# Patient Record
Sex: FEMALE | Race: BLACK OR AFRICAN AMERICAN | URBAN - METROPOLITAN AREA
[De-identification: names, ages, dates, MRNs, and addresses within clinical notes are randomized per-mention and may not be internally consistent; named-entity substitution may affect disease eponyms.]

---

## 2018-01-01 ENCOUNTER — INPATIENT (INPATIENT)
Facility: HOSPITAL | Age: 83
LOS: 3 days | DRG: 177 | End: 2018-04-09
Attending: STUDENT IN AN ORGANIZED HEALTH CARE EDUCATION/TRAINING PROGRAM | Admitting: STUDENT IN AN ORGANIZED HEALTH CARE EDUCATION/TRAINING PROGRAM
Payer: MEDICARE

## 2018-01-01 VITALS — TEMPERATURE: 97 F | RESPIRATION RATE: 14 BRPM | HEART RATE: 56 BPM | OXYGEN SATURATION: 96 %

## 2018-01-01 VITALS
DIASTOLIC BLOOD PRESSURE: 39 MMHG | HEART RATE: 30 BPM | SYSTOLIC BLOOD PRESSURE: 108 MMHG | RESPIRATION RATE: 16 BRPM | TEMPERATURE: 97 F

## 2018-01-01 DIAGNOSIS — Z78.9 OTHER SPECIFIED HEALTH STATUS: ICD-10-CM

## 2018-01-01 DIAGNOSIS — Z71.89 OTHER SPECIFIED COUNSELING: ICD-10-CM

## 2018-01-01 DIAGNOSIS — R63.8 OTHER SYMPTOMS AND SIGNS CONCERNING FOOD AND FLUID INTAKE: ICD-10-CM

## 2018-01-01 DIAGNOSIS — Z66 DO NOT RESUSCITATE: ICD-10-CM

## 2018-01-01 DIAGNOSIS — R11.10 VOMITING, UNSPECIFIED: ICD-10-CM

## 2018-01-01 DIAGNOSIS — J18.9 PNEUMONIA, UNSPECIFIED ORGANISM: ICD-10-CM

## 2018-01-01 DIAGNOSIS — I45.9 CONDUCTION DISORDER, UNSPECIFIED: ICD-10-CM

## 2018-01-01 DIAGNOSIS — I10 ESSENTIAL (PRIMARY) HYPERTENSION: ICD-10-CM

## 2018-01-01 DIAGNOSIS — Z51.5 ENCOUNTER FOR PALLIATIVE CARE: ICD-10-CM

## 2018-01-01 DIAGNOSIS — R00.1 BRADYCARDIA, UNSPECIFIED: ICD-10-CM

## 2018-01-01 DIAGNOSIS — Z29.9 ENCOUNTER FOR PROPHYLACTIC MEASURES, UNSPECIFIED: ICD-10-CM

## 2018-01-01 DIAGNOSIS — R09.89 OTHER SPECIFIED SYMPTOMS AND SIGNS INVOLVING THE CIRCULATORY AND RESPIRATORY SYSTEMS: ICD-10-CM

## 2018-01-01 DIAGNOSIS — I95.9 HYPOTENSION, UNSPECIFIED: ICD-10-CM

## 2018-01-01 DIAGNOSIS — R41.82 ALTERED MENTAL STATUS, UNSPECIFIED: ICD-10-CM

## 2018-01-01 DIAGNOSIS — E87.8 OTHER DISORDERS OF ELECTROLYTE AND FLUID BALANCE, NOT ELSEWHERE CLASSIFIED: ICD-10-CM

## 2018-01-01 DIAGNOSIS — D64.9 ANEMIA, UNSPECIFIED: ICD-10-CM

## 2018-01-01 LAB
ALBUMIN SERPL ELPH-MCNC: 3.4 G/DL — SIGNIFICANT CHANGE UP (ref 3.3–5)
ALP SERPL-CCNC: 92 U/L — SIGNIFICANT CHANGE UP (ref 40–120)
ALT FLD-CCNC: 84 U/L — HIGH (ref 10–45)
ANION GAP SERPL CALC-SCNC: 15 MMOL/L — SIGNIFICANT CHANGE UP (ref 5–17)
APTT BLD: 28.4 SEC — SIGNIFICANT CHANGE UP (ref 27.5–37.4)
AST SERPL-CCNC: 79 U/L — HIGH (ref 10–40)
BASE EXCESS BLDV CALC-SCNC: -2.3 MMOL/L — SIGNIFICANT CHANGE UP
BILIRUB SERPL-MCNC: 0.3 MG/DL — SIGNIFICANT CHANGE UP (ref 0.2–1.2)
BLD GP AB SCN SERPL QL: NEGATIVE — SIGNIFICANT CHANGE UP
BUN SERPL-MCNC: 43 MG/DL — HIGH (ref 7–23)
CA-I SERPL-SCNC: 1.43 MMOL/L — HIGH (ref 1.12–1.3)
CALCIUM SERPL-MCNC: 12.1 MG/DL — HIGH (ref 8.4–10.5)
CHLORIDE SERPL-SCNC: 74 MMOL/L — LOW (ref 96–108)
CK MB CFR SERPL CALC: 4.6 NG/ML — SIGNIFICANT CHANGE UP (ref 0–6.7)
CK SERPL-CCNC: 139 U/L — SIGNIFICANT CHANGE UP (ref 25–170)
CO2 SERPL-SCNC: 21 MMOL/L — LOW (ref 22–31)
CREAT SERPL-MCNC: 1.6 MG/DL — HIGH (ref 0.5–1.3)
GAS PNL BLDV: 109 MMOL/L — CRITICAL LOW (ref 138–146)
GAS PNL BLDV: SIGNIFICANT CHANGE UP
GAS PNL BLDV: SIGNIFICANT CHANGE UP
GLUCOSE SERPL-MCNC: 161 MG/DL — HIGH (ref 70–99)
HCO3 BLDV-SCNC: 22 MMOL/L — SIGNIFICANT CHANGE UP (ref 20–27)
HCT VFR BLD CALC: 22.8 % — LOW (ref 34.5–45)
HGB BLD-MCNC: 8 G/DL — LOW (ref 11.5–15.5)
INR BLD: 1.06 — SIGNIFICANT CHANGE UP (ref 0.88–1.16)
LACTATE SERPL-SCNC: 4.4 MMOL/L — CRITICAL HIGH (ref 0.5–2)
MCHC RBC-ENTMCNC: 25.6 PG — LOW (ref 27–34)
MCHC RBC-ENTMCNC: 35.1 G/DL — SIGNIFICANT CHANGE UP (ref 32–36)
MCV RBC AUTO: 72.8 FL — LOW (ref 80–100)
PCO2 BLDV: 36 MMHG — LOW (ref 41–51)
PH BLDV: 7.4 — SIGNIFICANT CHANGE UP (ref 7.32–7.43)
PLATELET # BLD AUTO: 274 K/UL — SIGNIFICANT CHANGE UP (ref 150–400)
PO2 BLDV: 49 MMHG — SIGNIFICANT CHANGE UP
POTASSIUM BLDV-SCNC: 4.7 MMOL/L — SIGNIFICANT CHANGE UP (ref 3.5–4.9)
POTASSIUM SERPL-MCNC: 5.4 MMOL/L — HIGH (ref 3.5–5.3)
POTASSIUM SERPL-SCNC: 5.4 MMOL/L — HIGH (ref 3.5–5.3)
PROT SERPL-MCNC: 6.2 G/DL — SIGNIFICANT CHANGE UP (ref 6–8.3)
PROTHROM AB SERPL-ACNC: 11.8 SEC — SIGNIFICANT CHANGE UP (ref 9.8–12.7)
RBC # BLD: 3.13 M/UL — LOW (ref 3.8–5.2)
RBC # FLD: 15.1 % — SIGNIFICANT CHANGE UP (ref 10.3–16.9)
RH IG SCN BLD-IMP: POSITIVE — SIGNIFICANT CHANGE UP
SAO2 % BLDV: 80 % — SIGNIFICANT CHANGE UP
SODIUM SERPL-SCNC: 110 MMOL/L — CRITICAL LOW (ref 135–145)
TROPONIN T SERPL-MCNC: 0.04 NG/ML — HIGH (ref 0–0.01)
WBC # BLD: 7.8 K/UL — SIGNIFICANT CHANGE UP (ref 3.8–10.5)
WBC # FLD AUTO: 7.8 K/UL — SIGNIFICANT CHANGE UP (ref 3.8–10.5)

## 2018-01-01 PROCEDURE — 99223 1ST HOSP IP/OBS HIGH 75: CPT | Mod: GC

## 2018-01-01 PROCEDURE — 71045 X-RAY EXAM CHEST 1 VIEW: CPT

## 2018-01-01 PROCEDURE — 99291 CRITICAL CARE FIRST HOUR: CPT | Mod: 25

## 2018-01-01 PROCEDURE — 96374 THER/PROPH/DIAG INJ IV PUSH: CPT

## 2018-01-01 PROCEDURE — 99233 SBSQ HOSP IP/OBS HIGH 50: CPT | Mod: GC

## 2018-01-01 PROCEDURE — 85027 COMPLETE CBC AUTOMATED: CPT

## 2018-01-01 PROCEDURE — 82803 BLOOD GASES ANY COMBINATION: CPT

## 2018-01-01 PROCEDURE — 86850 RBC ANTIBODY SCREEN: CPT

## 2018-01-01 PROCEDURE — 96375 TX/PRO/DX INJ NEW DRUG ADDON: CPT

## 2018-01-01 PROCEDURE — 86900 BLOOD TYPING SEROLOGIC ABO: CPT

## 2018-01-01 PROCEDURE — 86901 BLOOD TYPING SEROLOGIC RH(D): CPT

## 2018-01-01 PROCEDURE — 84132 ASSAY OF SERUM POTASSIUM: CPT

## 2018-01-01 PROCEDURE — 99233 SBSQ HOSP IP/OBS HIGH 50: CPT

## 2018-01-01 PROCEDURE — 82550 ASSAY OF CK (CPK): CPT

## 2018-01-01 PROCEDURE — 85610 PROTHROMBIN TIME: CPT

## 2018-01-01 PROCEDURE — 80053 COMPREHEN METABOLIC PANEL: CPT

## 2018-01-01 PROCEDURE — 84295 ASSAY OF SERUM SODIUM: CPT

## 2018-01-01 PROCEDURE — 99223 1ST HOSP IP/OBS HIGH 75: CPT

## 2018-01-01 PROCEDURE — 36415 COLL VENOUS BLD VENIPUNCTURE: CPT

## 2018-01-01 PROCEDURE — 99291 CRITICAL CARE FIRST HOUR: CPT

## 2018-01-01 PROCEDURE — 71045 X-RAY EXAM CHEST 1 VIEW: CPT | Mod: 26

## 2018-01-01 PROCEDURE — 93005 ELECTROCARDIOGRAM TRACING: CPT

## 2018-01-01 PROCEDURE — 82330 ASSAY OF CALCIUM: CPT

## 2018-01-01 PROCEDURE — 85730 THROMBOPLASTIN TIME PARTIAL: CPT

## 2018-01-01 PROCEDURE — 82553 CREATINE MB FRACTION: CPT

## 2018-01-01 PROCEDURE — 87040 BLOOD CULTURE FOR BACTERIA: CPT

## 2018-01-01 PROCEDURE — 84484 ASSAY OF TROPONIN QUANT: CPT

## 2018-01-01 PROCEDURE — 82962 GLUCOSE BLOOD TEST: CPT

## 2018-01-01 PROCEDURE — 99497 ADVNCD CARE PLAN 30 MIN: CPT | Mod: 25

## 2018-01-01 PROCEDURE — 83605 ASSAY OF LACTIC ACID: CPT

## 2018-01-01 RX ORDER — SODIUM CHLORIDE 9 MG/ML
1000 INJECTION INTRAMUSCULAR; INTRAVENOUS; SUBCUTANEOUS ONCE
Qty: 0 | Refills: 0 | Status: COMPLETED | OUTPATIENT
Start: 2018-01-01 | End: 2018-01-01

## 2018-01-01 RX ORDER — ATROPINE SULFATE 0.1 MG/ML
1 SYRINGE (ML) INJECTION ONCE
Qty: 0 | Refills: 0 | Status: COMPLETED | OUTPATIENT
Start: 2018-01-01 | End: 2018-01-01

## 2018-01-01 RX ORDER — AMPICILLIN SODIUM AND SULBACTAM SODIUM 250; 125 MG/ML; MG/ML
INJECTION, POWDER, FOR SUSPENSION INTRAMUSCULAR; INTRAVENOUS
Qty: 0 | Refills: 0 | Status: DISCONTINUED | OUTPATIENT
Start: 2018-01-01 | End: 2018-01-01

## 2018-01-01 RX ORDER — PIPERACILLIN AND TAZOBACTAM 4; .5 G/20ML; G/20ML
3.38 INJECTION, POWDER, LYOPHILIZED, FOR SOLUTION INTRAVENOUS ONCE
Qty: 0 | Refills: 0 | Status: COMPLETED | OUTPATIENT
Start: 2018-01-01 | End: 2018-01-01

## 2018-01-01 RX ORDER — AMPICILLIN SODIUM AND SULBACTAM SODIUM 250; 125 MG/ML; MG/ML
1.5 INJECTION, POWDER, FOR SUSPENSION INTRAMUSCULAR; INTRAVENOUS ONCE
Qty: 0 | Refills: 0 | Status: DISCONTINUED | OUTPATIENT
Start: 2018-01-01 | End: 2018-01-01

## 2018-01-01 RX ORDER — AMPICILLIN SODIUM AND SULBACTAM SODIUM 250; 125 MG/ML; MG/ML
1.5 INJECTION, POWDER, FOR SUSPENSION INTRAMUSCULAR; INTRAVENOUS ONCE
Qty: 0 | Refills: 0 | Status: COMPLETED | OUTPATIENT
Start: 2018-01-01 | End: 2018-01-01

## 2018-01-01 RX ORDER — CALCIUM CHLORIDE
1000 POWDER (GRAM) MISCELLANEOUS ONCE
Qty: 0 | Refills: 0 | Status: COMPLETED | OUTPATIENT
Start: 2018-01-01 | End: 2018-01-01

## 2018-01-01 RX ORDER — MORPHINE SULFATE 50 MG/1
3 CAPSULE, EXTENDED RELEASE ORAL EVERY 4 HOURS
Qty: 0 | Refills: 0 | Status: DISCONTINUED | OUTPATIENT
Start: 2018-01-01 | End: 2018-01-01

## 2018-01-01 RX ORDER — SODIUM BICARBONATE 1 MEQ/ML
50 SYRINGE (ML) INTRAVENOUS ONCE
Qty: 0 | Refills: 0 | Status: COMPLETED | OUTPATIENT
Start: 2018-01-01 | End: 2018-01-01

## 2018-01-01 RX ORDER — AMPICILLIN SODIUM AND SULBACTAM SODIUM 250; 125 MG/ML; MG/ML
1.5 INJECTION, POWDER, FOR SUSPENSION INTRAMUSCULAR; INTRAVENOUS DAILY
Qty: 0 | Refills: 0 | Status: DISCONTINUED | OUTPATIENT
Start: 2018-01-01 | End: 2018-01-01

## 2018-01-01 RX ORDER — SCOPALAMINE 1 MG/3D
1 PATCH, EXTENDED RELEASE TRANSDERMAL ONCE
Qty: 0 | Refills: 0 | Status: COMPLETED | OUTPATIENT
Start: 2018-01-01 | End: 2018-01-01

## 2018-01-01 RX ORDER — HEPARIN SODIUM 5000 [USP'U]/ML
5000 INJECTION INTRAVENOUS; SUBCUTANEOUS EVERY 12 HOURS
Qty: 0 | Refills: 0 | Status: DISCONTINUED | OUTPATIENT
Start: 2018-01-01 | End: 2018-01-01

## 2018-01-01 RX ORDER — VANCOMYCIN HCL 1 G
1000 VIAL (EA) INTRAVENOUS ONCE
Qty: 0 | Refills: 0 | Status: COMPLETED | OUTPATIENT
Start: 2018-01-01 | End: 2018-01-01

## 2018-01-01 RX ORDER — SODIUM CHLORIDE 9 MG/ML
1000 INJECTION INTRAMUSCULAR; INTRAVENOUS; SUBCUTANEOUS
Qty: 0 | Refills: 0 | Status: DISCONTINUED | OUTPATIENT
Start: 2018-01-01 | End: 2018-01-01

## 2018-01-01 RX ADMIN — HEPARIN SODIUM 5000 UNIT(S): 5000 INJECTION INTRAVENOUS; SUBCUTANEOUS at 06:41

## 2018-01-01 RX ADMIN — SODIUM CHLORIDE 1000 MILLILITER(S): 9 INJECTION INTRAMUSCULAR; INTRAVENOUS; SUBCUTANEOUS at 17:56

## 2018-01-01 RX ADMIN — PIPERACILLIN AND TAZOBACTAM 200 GRAM(S): 4; .5 INJECTION, POWDER, LYOPHILIZED, FOR SOLUTION INTRAVENOUS at 18:17

## 2018-01-01 RX ADMIN — SODIUM CHLORIDE 70 MILLILITER(S): 9 INJECTION INTRAMUSCULAR; INTRAVENOUS; SUBCUTANEOUS at 14:34

## 2018-01-01 RX ADMIN — Medication 1 MILLIGRAM(S): at 17:30

## 2018-01-01 RX ADMIN — SODIUM CHLORIDE 70 MILLILITER(S): 9 INJECTION INTRAMUSCULAR; INTRAVENOUS; SUBCUTANEOUS at 00:00

## 2018-01-01 RX ADMIN — Medication 250 MILLIGRAM(S): at 18:28

## 2018-01-01 RX ADMIN — Medication 50 MILLIEQUIVALENT(S): at 17:27

## 2018-01-01 RX ADMIN — MORPHINE SULFATE 3 MILLIGRAM(S): 50 CAPSULE, EXTENDED RELEASE ORAL at 15:48

## 2018-01-01 RX ADMIN — SODIUM CHLORIDE 70 MILLILITER(S): 9 INJECTION INTRAMUSCULAR; INTRAVENOUS; SUBCUTANEOUS at 05:01

## 2018-01-01 RX ADMIN — AMPICILLIN SODIUM AND SULBACTAM SODIUM 100 GRAM(S): 250; 125 INJECTION, POWDER, FOR SUSPENSION INTRAMUSCULAR; INTRAVENOUS at 06:41

## 2018-01-01 RX ADMIN — SODIUM CHLORIDE 70 MILLILITER(S): 9 INJECTION INTRAMUSCULAR; INTRAVENOUS; SUBCUTANEOUS at 23:02

## 2018-01-01 RX ADMIN — MORPHINE SULFATE 3 MILLIGRAM(S): 50 CAPSULE, EXTENDED RELEASE ORAL at 12:53

## 2018-01-01 RX ADMIN — MORPHINE SULFATE 3 MILLIGRAM(S): 50 CAPSULE, EXTENDED RELEASE ORAL at 16:30

## 2018-01-01 RX ADMIN — SODIUM CHLORIDE 1000 MILLILITER(S): 9 INJECTION INTRAMUSCULAR; INTRAVENOUS; SUBCUTANEOUS at 18:37

## 2018-01-01 RX ADMIN — Medication 1000 MILLIGRAM(S): at 17:26

## 2018-01-01 RX ADMIN — MORPHINE SULFATE 3 MILLIGRAM(S): 50 CAPSULE, EXTENDED RELEASE ORAL at 15:20

## 2018-01-01 RX ADMIN — SCOPALAMINE 1 PATCH: 1 PATCH, EXTENDED RELEASE TRANSDERMAL at 15:32

## 2018-04-05 NOTE — H&P ADULT - ASSESSMENT
89F with HTN, GERD, anemia, bedbound p/w altered mental status a/w vomiting, diarrhea found to have junctional bradycardia with electrolyte derangement (severe hyponatremia, hypercalcemia), concern for aspiration pneumonitis with goals of comfort care.

## 2018-04-05 NOTE — H&P ADULT - NSHPPHYSICALEXAM_GEN_ALL_CORE
Vital Signs  T(C): 36.3 (05 Apr 2018 17:38), Max: 36.3 (05 Apr 2018 17:38)  T(F): 97.4 (05 Apr 2018 17:38), Max: 97.4 (05 Apr 2018 17:38)  HR: 31 (05 Apr 2018 18:34) (30 - 55)  BP: 89/41 (05 Apr 2018 18:34) (89/41 - 108/39)  RR: 16 (05 Apr 2018 18:34) (16 - 16)  SpO2: 100% (05 Apr 2018 18:34) (100% - 100%)    General: Frail woman, NAD, responds to questions intermittently   Head: Normocephalic; atraumatic  Eyes: Could not assess, kept closing her eyes   ENMT: No nasal discharge; airway clear  Neck: Supple; non tender; no masses  Respiratory: Good air entry. Decreased breath sounds at bases  Cardiovascular: Bradycardic. Did not appreciate murmur  Gastrointestinal: Soft, non-tender, distended; Normal bowel sounds  Extremities: No clubbing, cyanosis or edema  Neuro -  - Mental Status:  Somnolent, minimally responsive. Vital Signs  T(C): 36.3 (05 Apr 2018 17:38), Max: 36.3 (05 Apr 2018 17:38)  T(F): 97.4 (05 Apr 2018 17:38), Max: 97.4 (05 Apr 2018 17:38)  HR: 31 (05 Apr 2018 18:34) (30 - 55)  BP: 89/41 (05 Apr 2018 18:34) (89/41 - 108/39)  RR: 16 (05 Apr 2018 18:34) (16 - 16)  SpO2: 100% (05 Apr 2018 18:34) (100% - 100%)    General: Frail woman, NAD, responds to questions intermittently   Head: Normocephalic; atraumatic  Eyes: Could not assess, kept closing her eyes   ENMT: No nasal discharge; airway clear  Neck: Supple; non tender; no masses  Respiratory: Good air entry. ?rhonchi on RML. Decreased breath sounds at bases  Cardiovascular: Bradycardic. Did not appreciate murmur  Gastrointestinal: Soft, non-tender, distended; Normal bowel sounds  Extremities: No clubbing, cyanosis or edema  Neuro -  - Mental Status:  Somnolent, minimally responsive.

## 2018-04-05 NOTE — ED PROVIDER NOTE - MEDICAL DECISION MAKING DETAILS
89 F hx HTN GERD  with ams x 1 day- - heart block - rate 20 --recent N/V D  daughter wants pt DNR DNI  and comfort care  MOLST signed  given fluids abx to cover for sepsis ? RLL infilt

## 2018-04-05 NOTE — ED PROVIDER NOTE - CRITICAL CARE PROVIDED
interpretation of diagnostic studies/consultation with other physicians/conducted a detailed discussion of DNR status/telephone consultation with the patient's family/direct patient care (not related to procedure)/consult w/ pt's family directly relating to pts condition/documentation

## 2018-04-05 NOTE — H&P ADULT - PROBLEM SELECTOR PLAN 5
Resolved. Low suspicion of infectious etiology right now  - continue IVF severe hyponatremia and hypercalcemia on initial labs, pt started on IVFs, given comfort care status, no further blood draws.

## 2018-04-05 NOTE — H&P ADULT - PROBLEM SELECTOR PLAN 1
Patient likely had an acute cardiac event found to have junctional bradycardia with minimal response. Based on her age and poor functional status, she is poor candidate for any acute cardiac intervention. She also has severe hyponatremia and appears dry on exam. Also found to have hypercalcemia which can also explain the lethargy in addition to the bradycardia. MOLTS completed, ok to give antibiotics and fluids. Pt has already received 2L NS bolus, based on her size, will give maintenance at a gentle rate to avoid any pulmonary congestion   - NS @ 70cc/hr. Will reassess and can discontinue fluid tomorrow

## 2018-04-05 NOTE — H&P ADULT - NSHPLABSRESULTS_GEN_ALL_CORE
8.0    7.8   )-----------( 274      ( 05 Apr 2018 17:49 )             22.8   04-05    110<LL>  |  74<L>  |  43<H>  ----------------------------<  161<H>  5.4<H>   |  21<L>  |  1.60<H>    Ca    12.1<H>      05 Apr 2018 17:49    TPro  6.2  /  Alb  3.4  /  TBili  0.3  /  DBili  x   /  AST  79<H>  /  ALT  84<H>  /  AlkPhos  92  04-05    PT/INR - ( 05 Apr 2018 17:49 )   PT: 11.8 sec;   INR: 1.06     PTT - ( 05 Apr 2018 17:49 )  PTT:28.4 sec    Lactate, Blood: 4.4    CARDIAC MARKERS ( 05 Apr 2018 17:49 )  x     / 0.04 ng/mL / 139 U/L / x     / 4.6 ng/mL 8.0    7.8   )-----------( 274      ( 05 Apr 2018 17:49 )             22.8   04-05    110<LL>  |  74<L>  |  43<H>  ----------------------------<  161<H>  5.4<H>   |  21<L>  |  1.60<H>    Ca    12.1<H>      05 Apr 2018 17:49    TPro  6.2  /  Alb  3.4  /  TBili  0.3  /  DBili  x   /  AST  79<H>  /  ALT  84<H>  /  AlkPhos  92  04-05    PT/INR - ( 05 Apr 2018 17:49 )   PT: 11.8 sec;   INR: 1.06     PTT - ( 05 Apr 2018 17:49 )  PTT:28.4 sec    Lactate, Blood: 4.4    CARDIAC MARKERS ( 05 Apr 2018 17:49 )  x     / 0.04 ng/mL / 139 U/L / x     / 4.6 ng/mL    VBG  pH 7.4/pCO2 36

## 2018-04-05 NOTE — H&P ADULT - PROBLEM SELECTOR PLAN 4
Junctional bradycardia with HR palpated in 50s now  - No intervention Resolved. Low suspicion of infectious etiology right now  - continue IVF

## 2018-04-05 NOTE — H&P ADULT - PROBLEM SELECTOR PLAN 8
NPO right now. Can attempt pleasure feeds when more awake Patient is acutely ill right now. Daughter, Edith is the decision maker, pt is now DNR/DNI with comfort measure. Discussed with daughter about home hospice service, she is amenable to setting up services at home.  - No MEWS  - Palliative consult for hospice set up

## 2018-04-05 NOTE — H&P ADULT - ATTENDING COMMENTS
patient seen and examined    reviewed vs, labs, available radiological reports/ studies    agree w/ PE findings as above w/ additions/ exceptions/ pertinent findings:     1. AMS w/ jucntional bradycardia and likely PNA patient seen and examined    reviewed vs, labs, available radiological reports/ studies    agree w/ PE findings as above w/ additions/ exceptions: frail elderly female, contracted, nonverbal during my interaction; rest of exam as above     1. AMS w/ jucntional bradycardia ( per ED notes) and concern for aspiration PNA: per HCP wished patient to be comfort care w/ IVFs and trial of abx; started on unasyn for possible aspiration PNA, given IVFs overnight, monitor fluid status; no blood draws, holding BP meds in setting of hypotension monitor vital signs; palliative consult in AM; grave prognosis.

## 2018-04-05 NOTE — ED ADULT TRIAGE NOTE - CHIEF COMPLAINT QUOTE
biba from home for vomiting, diarrhea x 2 days after starting on Johana-olaf.  Patient bradycardic, diaphoretic in triage.

## 2018-04-05 NOTE — ED ADULT NURSE NOTE - OBJECTIVE STATEMENT
89 y/o female BIBEMS for cief c/o n/v possibly from medication gastroteck. pt found to be in 3rd degree heartblock with HR of 25. pt placed on pacer =pads, continuos cardiac monitor, EKG obtained. began TCP per MD Patrick with rate of 60. pt here with daughter. per daughter, comfort care only, DNR/DNI.

## 2018-04-05 NOTE — H&P ADULT - PROBLEM SELECTOR PLAN 2
Concern for aspiration, likely has aspiration pneumonitis, no fever/leukocytosis, cough, already received Vancomycin and Zosyn in ED. No MRSA or pseudomonas risk factors  - Would consider Unasyn

## 2018-04-05 NOTE — ED PROVIDER NOTE - OBJECTIVE STATEMENT
89 F with hx of GERD, anemia HTN with ams and bradycardia x 1 day-  pos N/V and diarrhea  no report of fevers or chills - no headache- pt has been less responsive over the past 24 hrs

## 2018-04-05 NOTE — ED PROVIDER NOTE - CPE EDP NEURO NORM
I have personally performed a face to face diagnostic evaluation on this patient. I have reviewed the ACP note and agree with the history, exam and plan of care, except as noted.
normal...

## 2018-04-05 NOTE — ED PROVIDER NOTE - CARE PLAN
Principal Discharge DX:	Altered mental status  Secondary Diagnosis:	Heart block  Secondary Diagnosis:	Vomiting and diarrhea

## 2018-04-05 NOTE — H&P ADULT - PROBLEM SELECTOR PLAN 6
Unable to obtain blood pressure on cuff but pt is awake.  - Monitor intermittently Junctional bradycardia with HR palpated in 50s now  - No intervention

## 2018-04-05 NOTE — H&P ADULT - PROBLEM SELECTOR PLAN 7
Patient is acutely ill right now. Daughter, Edith is the decision maker, pt is now DNR/DNI with comfort measure. Discussed with daughter about home hospice service, she is amenable to setting up services at home.  - No MEWS  - Palliative consult for hospice set up hx of HTN; Unable to obtain blood pressure on cuff but pt is awake.  - Monitor intermittently, hold all BP medications

## 2018-04-05 NOTE — H&P ADULT - HISTORY OF PRESENT ILLNESS
89F with HTN, GERD, anemia 89F with HTN, GERD, anemia, bedbound p/w altered mental status. History was obtained from daughter, Edith at bedside who is the patient's caretaker. Pt is usually bedbound, eats well, interactive at baseline. She had constipation earlier this week, started to eat less. She was given bowel regimen, with several episode of formed stool output. Yesterday,  she had two episodes of NBNB vomiting and was less responsive this afternoon, so was brought to the ED. Pt was found to be bradycardic HR 20- 30s with third degree AV block, BP 89/41. Labs were also remarkable severe hyponatremia (Na 110), hypercalcemia (Ca 12.1), lactate 4.4, given 2LNS boluses, atropine and sodium bicarbonate, and Vancomycin and Zosyn.    ED physician discussed the severity of patient's illness with daughter, who is the healthcare proxy and pt was made DNR/DNI with comfort care, with IVF and antibiotics.

## 2018-04-06 NOTE — PROGRESS NOTE ADULT - PROBLEM SELECTOR PLAN 7
hx of HTN; Unable to obtain blood pressure on cuff but pt is awake.  - Monitor intermittently, hold all BP medications hx of HTN but hypotensive on presentation in setting of bradycardia.   - c/w holding antihypertensives. No vitals/MEWs at this time as per HCP discussion o/n.

## 2018-04-06 NOTE — CONSULT NOTE ADULT - SUBJECTIVE AND OBJECTIVE BOX
LANI SAWYER   MRN-[id number]     (1914):     HPI:  89F with HTN, GERD, anemia, bedbound p/w altered mental status. History was obtained from daughter, Edith at bedside who is the patient's caretaker. Pt is usually bedbound, eats well, interactive at baseline. She had constipation earlier this week, started to eat less. She was given bowel regimen, with several episode of formed stool output.  Yesterday,  she had two episodes of NBNB vomiting and was less responsive this afternoon, so was brought to the ED. Pt was found to be bradycardic HR 20- 30s with third degree AV block, BP 89/41. Labs were also remarkable severe hyponatremia (Na 110), hypercalcemia (Ca 12.1), lactate 4.4, given 2LNS boluses, atropine and sodium bicarbonate, and Vancomycin and Zosyn.    ED physician discussed the severity of patient's illness with daughter, who is the healthcare proxy and pt was made DNR/DNI with comfort care, with IVF and antibiotics. (2018 20:25)    Pt remains with poor mental status. Not eating.  Non communicative. On IV fluid.    PAST MEDICAL & SURGICAL HISTORY:  GERD (gastroesophageal reflux disease)  HTN (hypertension)  Anemia  No significant past surgical history    FAMILY HISTORY: unable to attain  No pertinent family history in first degree relatives  Reviewed and     ROS:    Unable to attain ROS due to:   mental status                    Dyspnea (Makenna 0-10):                        N/V (Y/N):                              Secretions (Y/N) :                Agitation(Y/N):   Pain (Y/N):       -Provocation/Palliation:  -Quality/Quantity:  -Radiating:  -Severity:  -Timing/Frequency:  -Impact on ADLs:    General:  Denied  HEENT:    Denied  Neck:  Denied  CVS:  Denied  Resp:  Denied  GI:  Denied  :  Denied  Musc:  Denied  Neuro:  Denied  Psych:  Denied  Skin:  Denied  Lymph:  Denied    Allergies:  No Known Allergies    Intolerances    Opiate Naive (Y/N):  yes  -iStop reviewed (Y/N):  yes Blue # [id number]    Medications:      MEDICATIONS  (STANDING):  ampicillin/sulbactam  IVPB      heparin  Injectable 5000 Unit(s) SubCutaneous every 12 hours  sodium chloride 0.9%. 1000 milliLiter(s) (70 mL/Hr) IV Continuous <Continuous>    MEDICATIONS  (PRN):    Labs:    CBC:                        8.0    7.8   )-----------( 274      ( 2018 17:49 )             22.8     CMP:        110<LL>  |  74<L>  |  43<H>  ----------------------------<  161<H>  5.4<H>   |  21<L>  |  1.60<H>    Ca    12.1<H>      2018 17:49    TPro  6.2  /  Alb  3.4  /  TBili  0.3  /  DBili  x   /  AST  79<H>  /  ALT  84<H>  /  AlkPhos  92      PT/INR - ( 2018 17:49 )   PT: 11.8 sec;   INR: 1.06     PTT - ( 2018 17:49 )  PTT:28.4 sec    Imaging:  no imaging available    PEx:  T(C): 36.4 (18 @ 05:40), Max: 36.4 (18 @ 05:40)  HR: 31 (18 @ 18:34) (30 - 55)  BP: 89/41 (18 @ 18:34) (89/41 - 108/39)  RR: 16 (18 @ 18:34) (16 - 16)  SpO2: 100% (18 @ 18:34) (100% - 100%)  Daily Weight in k (2018 05:03)      POCT Blood Glucose.: 143 mg/dL (2018 17:22)    General: ill appearing, frail, not distressed  HEENT:  nc/at, moist oral cavity, opens eyes briefly x 1, slight temporal wasting  Neck:  supple  CVS:  irreg, severe bradycardia,  distal pulses do not correlate with heart tones  Resp:  shallow, + abdominal breathing  GI:  rounded, soft, nontender, +BS  :  incontient  Musc:   weak, not following commands, pt attempting to remove her nasan canula, not other spontaneous movements noted  Neuro:  not following commands  Psych:   zaki, pt lethargic  Skin:  warm, dry, intact, no signs of breakdown  Lymph:  neg  Preadmit Karnofsky:  40 %           Current Karnofsky:  20   %  Cachexia (Y/N):  no  BMI:  not calculated as pts height not recorded in the med record    Advanced Directives:          DNR/DNI     MOLST- completed in ED last night         Decision maker:  Pt's does not appear to have medical decision making capacity as she has a somnolent mental status, and is not communicative.  Legal surrogate:  Pt"s dgt Edith Gaffney is pt"s HCP    Social History:     GOALS OF CARE DISCUSSION:       Palliative care info/counseling provided	           Family meeting       Advanced Directives addressed please see Advance Care Planning Note	           See previous Palliative Medicine Note       Documentation of GOC: 	          REFERRALS:	        Palliative Med        Unit SW/Case Mgmt              Speech/Swallow       Music Therapy       Hospice       Nutrition

## 2018-04-06 NOTE — CONSULT NOTE ADULT - PROBLEM SELECTOR RECOMMENDATION 3
Na 110 and calcium of 12.  Pt remains on IVF  for hydration  even if lytes are corrected, they are likely to become altered again as pt fails to consume sufficient hydration.   No benefit to continue to monitor labs, as the goal is comfort care

## 2018-04-06 NOTE — PROGRESS NOTE ADULT - PROBLEM SELECTOR PLAN 5
severe hyponatremia and hypercalcemia on initial labs, pt started on IVFs, given comfort care status, no further blood draws. Severe hyponatremia and hypercalcemia on initial labs, pt started on IVFs, given comfort care status, no further blood draws.

## 2018-04-06 NOTE — CONSULT NOTE ADULT - PROBLEM SELECTOR RECOMMENDATION 9
pt with somnolence, not communicative.  ED notes reflect that pt was O x3 on admission.  Not alert enough at this time to safely trial po nutrition.  Pt remains on IV fluid for hydration and electrolyte correction  Based on pts NA of 110 and Ca of 12, would not expect significant recovery of pts mental status. Support provided to patient and family. Patient to have access to supportive services during rest of hospital stay as the patient/family deemed necessary ie. Chaplaincy, Massage therapy, Music therapy, Patient and family supportive services, Palliative SW, etc.    Call placed to pts dgt, Edith Gaffney to discuss home hospice. No one available, no VM available.

## 2018-04-06 NOTE — PROGRESS NOTE ADULT - PROBLEM SELECTOR PLAN 9
F: IVF  E: Replete electrolytes to maintain K>4 and Mg>2  N:   Diet as tolerated F: IVF at 70cc/hr NS  E: No longer monitoring labs, comfort measures at this time  N:  NPO as still remains altered for comfort feeds.

## 2018-04-06 NOTE — PROGRESS NOTE ADULT - PROBLEM SELECTOR PLAN 1
Concern for potential cardiac event for which patient found to have 3rd degree AV block. Not a candidate for any acute cardiac intervention. Also noted to have severe electrolyte abnormalities including hyponatremia and hypercalemia. Also CXR concern for infiltrate and concern for ongoing Aspiration PNA- though without fever or leukocytosis. No cough. Started on Unasyn. O/N goals of care addressed with family for which patient made DNR/DNI with comfort measures, no MEWS- only to receive IVF and abx.   -c/w comfort measures and currently on Unasyn (day 1) Concern for potential cardiac event for which patient found to have 3rd degree AV block. Not a candidate for any acute cardiac intervention. Also noted to have severe electrolyte abnormalities including hyponatremia and hypercalemia. Also CXR concern for infiltrate and concern for ongoing Aspiration PNA- though without fever or leukocytosis. No cough. Started on Unasyn. O/N goals of care addressed with family for which patient made DNR/DNI with comfort measures, no MEWS- only to receive IVF and abx.   -c/w comfort measures- no lab draws, no mews or escalation of care. LAZARAST in chart. Palliative consulted for home hospice.   -c/w Unasyn (day 1) and IVF

## 2018-04-06 NOTE — CONSULT NOTE ADULT - PROBLEM SELECTOR RECOMMENDATION 4
HR remains bradycardiac after atropine.  no intervention planned.  Will speak to pts dgt about utilizing home hospice care.

## 2018-04-06 NOTE — PROGRESS NOTE ADULT - PROBLEM SELECTOR PLAN 4
Resolved. Low suspicion of infectious etiology right now  - continue IVF Resolved at this time. Likely 2/2 to severe electrolyte abnormalities. May be infectious if suspicion for aspiration though less likely. Patient now comfort measures pending home hospice placement.

## 2018-04-06 NOTE — PROGRESS NOTE ADULT - PROBLEM SELECTOR PLAN 6
Junctional bradycardia with HR palpated in 50s now  - No intervention P/w Bradycardia HR in 20-30s on presentation s/p atropine. As above concern for cardiac event and not a candidate. No longer monitoring Vital signs/Mews for comfort measures.   - c/w comfort measures, can have IVF and abx.

## 2018-04-06 NOTE — PROGRESS NOTE ADULT - PROBLEM SELECTOR PLAN 3
Hx of Anemia. Hgb at 8 on admission. Now with no lab draws and now following at this time for comfort measures. Hx of Anemia. Hgb at 8 on admission. Now with no lab draws and now following at this time for comfort measures.  -c/w comfort measures.

## 2018-04-06 NOTE — CONSULT NOTE ADULT - PROBLEM SELECTOR RECOMMENDATION 6
no further labs or VS monitoring.  Call placed to pts HCP to discuss and initiated home hospice planning.  No one answered phone, no VM available

## 2018-04-06 NOTE — PROGRESS NOTE ADULT - PROBLEM SELECTOR PLAN 8
Patient is acutely ill right now. Daughter, Edith is the decision maker, pt is now DNR/DNI with comfort measure. Discussed with daughter about home hospice service, she is amenable to setting up services at home.  - No MEWS  - Palliative consult for hospice set up Given acute findings of bradycardia, electrolyte abnormalities and not candidate for cardiac intervention, patient daughter/HCP made decision to make patient DNR/DNI with comfort measures- no MEWS or lab draws. Palliative consulted this AM.   - No MEWS  - Palliative consult for hospice set up, pending discussion with daughter

## 2018-04-06 NOTE — PROGRESS NOTE ADULT - PROBLEM SELECTOR PLAN 10
VTE: HSQ    DNR/DNI- molst in chart. VTE: Initially on HSQ but discontinued for comfort measures.    DNR/DNI- molst in chart.    Dispo: Admitted to [facility] for altered mental status but now comfort measures pending home hospice placement.

## 2018-04-07 NOTE — PROGRESS NOTE ADULT - PROBLEM SELECTOR PLAN 4
P/w Bradycardia HR in 20-30s on presentation s/p atropine. As above concern for cardiac event and not a candidate. No longer monitoring Vital signs/Mews for comfort measures.   - c/w comfort measures, can have IVF and abx.

## 2018-04-07 NOTE — PROGRESS NOTE ADULT - PROBLEM SELECTOR PLAN 5
hx of HTN but hypotensive on presentation in setting of bradycardia.   - c/w holding antihypertensives. No vitals/MEWs at this time as per HCP discussion o/n.

## 2018-04-07 NOTE — PROGRESS NOTE ADULT - PROBLEM SELECTOR PLAN 8
VTE: Initially on HSQ but discontinued for comfort measures.    DNR/DNI- molst in chart.    Dispo: Admitted to [facility] for altered mental status but now comfort measures pending home hospice placement.

## 2018-04-07 NOTE — PROGRESS NOTE ADULT - PROBLEM SELECTOR PLAN 7
F: IVF at 70cc/hr NS  E: No longer monitoring labs, comfort measures at this time  N:  NPO as still remains obtunded for comfort feeds.

## 2018-04-07 NOTE — PROGRESS NOTE ADULT - PROBLEM SELECTOR PLAN 6
Given acute findings of bradycardia, electrolyte abnormalities and not candidate for cardiac intervention, patient daughter/HCP made decision to make patient DNR/DNI with comfort measures- no MEWS or lab draws. Palliative consulted this AM.   - No MEWS  - Palliative consult for hospice set up, pending discussion with daughter

## 2018-04-07 NOTE — PROGRESS NOTE ADULT - PROBLEM SELECTOR PLAN 3
Severe hyponatremia and hypercalcemia on initial labs, pt started on IVFs, given comfort care status, no further blood draws.  -no acute intervention or need for telemetry

## 2018-04-08 NOTE — PROGRESS NOTE ADULT - PROBLEM SELECTOR PLAN 4
P/w Bradycardia HR in 20-30s on presentation s/p atropine. As above concern for cardiac event and not a candidate. No longer monitoring Vital signs/Mews for comfort measures.   - c/w comfort measures, can have IVF and abx.  C/w morphine prn, given at time of my visit

## 2018-04-08 NOTE — PROGRESS NOTE ADULT - PROBLEM SELECTOR PLAN 5
hx of HTN but hypotensive on presentation in setting of bradycardia.   - c/w holding antihypertensives. No vitals/MEWs at this time as per HCP discussion on admission.

## 2018-04-08 NOTE — PROGRESS NOTE ADULT - PROBLEM SELECTOR PLAN 6
Given acute findings of bradycardia, electrolyte abnormalities and not candidate for cardiac intervention, patient daughter/HCP made decision to make patient DNR/DNI with comfort measures- no MEWS or lab draws.   Palliative follow up tomorrow, in agreement with hospice care.  - No MEWS

## 2018-04-09 NOTE — PROGRESS NOTE ADULT - PROBLEM SELECTOR PLAN 4
pt s/p atropine in the ED 4 days ago. remains bradycardiac.  Pt is in her terminal syndrome with worsening cardiovascular status.

## 2018-04-09 NOTE — PROGRESS NOTE ADULT - PROBLEM SELECTOR PLAN 2
Hx of Anemia. Hgb at 8 on admission. Now with no lab draws and now following at this time for comfort measures.  -c/w comfort measures. Morphine prn.
Undergoing tx for aspiration pna. Poor inspiratory effort oN CXR and b/l infiltrate. - Will continue with Unasyn (day 1)
Hx of Anemia. Hgb at 8 on admission. Now with no lab draws and now following at this time for comfort measures.  -c/w comfort measures.
Hx of Anemia. Hgb at 8 on admission. Now with no lab draws and now following at this time for comfort measures.  -c/w comfort measures.
no respiratory congestion noted with breathing.   Pts family counselled on the burdens of IV fluid at end of life.  HCP not willing to forgo IVF now, but she understands that if pt has rattling, we will stop fluid. She understands.   Continue scopolamine patch, consider prn atropine eye drops as noted in my initial consult.

## 2018-04-09 NOTE — PROGRESS NOTE ADULT - PROBLEM SELECTOR PROBLEM 3
Anemia
Electrolyte disturbance

## 2018-04-09 NOTE — PROGRESS NOTE ADULT - PROBLEM SELECTOR PLAN 6
Given acute findings of bradycardia, electrolyte abnormalities and not candidate for cardiac intervention, patient daughter/HCP made decision to make patient DNR/DNI with comfort measures- no MEWS or lab draws. Palliative consulted this AM.   - No MEWS  - Palliative care following - plan for home with home hospice.

## 2018-04-09 NOTE — PROGRESS NOTE ADULT - PROBLEM SELECTOR PROBLEM 5
Electrolyte disturbance
Hypotension, unspecified hypotension type
Comfort measures only status
Hypotension, unspecified hypotension type
Hypotension, unspecified hypotension type

## 2018-04-09 NOTE — DISCHARGE NOTE ADULT - HOSPITAL COURSE
(History was obtained from daughter, CHINEDU)  89F with HTN, GERD, anemia, bedbound p/w altered mental status above baseline; Pt is usually bedbound, eats well, interactive at baseline. She had constipation earlier this week, started to eat less. She was given bowel regimen, with several episode of formed stool output. Day prior to presentation, had NBNB vomiting and was less responsive -  then presented to [facility] ED. Pt found to be HR 20-30s, 3deg AV block, hypotensive 89/41, hyponatremic (Na 110),  hypercalcemia (Ca 12.1), lactate 4.4, given 2LNS boluses, atropine and sodium bicarbonate, and Vancomycin and Zosyn.ED physician discussed the severity of patient's illness with daughter, who is the healthcare proxy and pt was made DNR/DNI with comfort care, with IVF and antibiotics -  patient hen transferred to [facility] for further management and pending placement. Per discussion with family (Edith) and palliative medicine, patient planned for discharge home with home hospice.

## 2018-04-09 NOTE — PROGRESS NOTE ADULT - SUBJECTIVE AND OBJECTIVE BOX
PROGRESS NOTE    CC: Altered mental status, nausea, vomiting  Overnight Events: P/w bradycardia s/p atropine EKG significant for 3rd degree AV block. Patient made DNR/DNI, comfort measures with family. No MEWs  Interval History: Minimal verbal, Can follow commands intermittently. Some complaints of arm pain. Denies shortness of breath or chest pain.  ROS: Difficulty to assess but as above.    OBJECTIVE  Vitals:  T(C): 36.4 (04-06-18 @ 05:40), Max: 36.4 (04-06-18 @ 05:40)  HR: 31 (04-05-18 @ 18:34) (30 - 55)  BP: 89/41 (04-05-18 @ 18:34) (89/41 - 108/39)  RR: 16 (04-05-18 @ 18:34) (16 - 16)  SpO2: 100% (04-05-18 @ 18:34) (100% - 100%)  Wt(kg): --    I/O:  I&O's Summary      PHYSICAL EXAM:  Appearance: NAD. Minimal verbal  HEENT:   Conjunctiva clear b/l. Moist oral mucosa.  Cardiovascular: RRR with no murmurs.  Respiratory: Lungs CTAB.   Gastrointestinal:  Soft, nontender. Non-distended. Non-rigid.	  Extremities: No edema b/l. No erythema b/l. LE WWP b/l.  Vascular: DP 2+ b/l.  Neurologic:  Alert and awake and making eye contact. Moving all extremities, intermittent following commands.   	  LABS:                        8.0    7.8   )-----------( 274      ( 05 Apr 2018 17:49 )             22.8     04-05    110<LL>  |  74<L>  |  43<H>  ----------------------------<  161<H>  5.4<H>   |  21<L>  |  1.60<H>    Ca    12.1<H>      05 Apr 2018 17:49    TPro  6.2  /  Alb  3.4  /  TBili  0.3  /  DBili  x   /  AST  79<H>  /  ALT  84<H>  /  AlkPhos  92  04-05    PT/INR - ( 05 Apr 2018 17:49 )   PT: 11.8 sec;   INR: 1.06          PTT - ( 05 Apr 2018 17:49 )  PTT:28.4 sec      RADIOLOGY & ADDITIONAL TESTS:  Reviewed .    MEDICATIONS  (STANDING):  ampicillin/sulbactam  IVPB      heparin  Injectable 5000 Unit(s) SubCutaneous every 12 hours  sodium chloride 0.9%. 1000 milliLiter(s) (70 mL/Hr) IV Continuous <Continuous>
Patient is a 89y old  Female who presents with a chief complaint of AMS (05 Apr 2018 20:25)      INTERVAL HPI/OVERNIGHT EVENTS:  Seen this afternoon. Lying in bed with a bit of labored breathing. Unresponsive to verbal stimuli. Daughter at bedside.    Review of Systems: 12 point review of systems otherwise negative    MEDICATIONS  (STANDING):  sodium chloride 0.9%. 1000 milliLiter(s) (70 mL/Hr) IV Continuous <Continuous>    MEDICATIONS  (PRN):  morphine  - Injectable 3 milliGRAM(s) IV Push every 4 hours PRN RR >16      Allergies    No Known Allergies    Intolerances          Vital Signs Last 24 Hrs  T(C): 36.3 (08 Apr 2018 16:00), Max: 36.3 (08 Apr 2018 16:00)  T(F): 97.3 (08 Apr 2018 16:00), Max: 97.3 (08 Apr 2018 16:00)  HR: 56 (08 Apr 2018 16:00) (56 - 56)  BP: --  BP(mean): --  RR: 14 (08 Apr 2018 16:00) (14 - 14)  SpO2: 96% (08 Apr 2018 16:00) (96% - 96%)  CAPILLARY BLOOD GLUCOSE          04-08 @ 07:01  -  04-08 @ 21:03  --------------------------------------------------------  IN: 840 mL / OUT: 0 mL / NET: 840 mL        Physical Exam:    Daily     Daily   General:  A bit tachypneic at time of visit, otherwise comfortable, not following commands, not opening eyes, with NC on place  HEENT:  Nonicteric  CV:  RRR  Lungs:  Decreased AE B/L  Abdomen:  Soft, non-tender, no distended, positive BS, no hepatosplenomegaly  Extremities:  2+ pulses, no c/c, no edema  Skin:  Warm and dry, no rashes  :  No simms  No Restraints    LABS:                  RADIOLOGY & ADDITIONAL TESTS:
INTERVAL HPI/OVERNIGHT EVENTS:  No reported events from overnight team or nursing, patient remained hemodynamically stable. This morning at bedside patient sleeping and minimally responsive    Vital Signs Last 24 Hrs  T(C): 36.7 (07 Apr 2018 11:32), Max: 36.8 (06 Apr 2018 20:58)  T(F): 98 (07 Apr 2018 11:32), Max: 98.2 (06 Apr 2018 20:58)  HR: 32 (06 Apr 2018 20:58) (32 - 32)  BP: --  BP(mean): --  ABP: --  ABP(mean): --  RR: --  SpO2: --      PHYSICAL EXAM:  Constitutional: NAD, lying in bed comfortably, elderly  HEENT: no eye discharge, no nasal discharge, no pharyngeal erythema, moist membranes  Neck: no lymphadenopathy, no JVD,  no carotid bruit  Cardiovascular: S1 and S2, RRR,  no M/R/G, non-displaced PMI  Respiratory: no wheezing, no rhonchi, no cough, no crackles, poor inspiratory effort  Gastrointestinal: BS+, soft, non-distended, non-tender, no ascites  Extremities: warm to touch, no edema  Vascular: 2+ peripheral pulses, normal capillary refill  Neurological: obtunded , nonfocal  Musculoskeletal: no joint swelling  Skin: No rashes, no skin breakdown      MEDICATIONS  (STANDING):  sodium chloride 0.9%. 1000 milliLiter(s) (70 mL/Hr) IV Continuous <Continuous>    MEDICATIONS  (PRN):  morphine  - Injectable 3 milliGRAM(s) IV Push every 4 hours PRN RR >16  scopolamine   Patch 1 Patch Transdermal once PRN increased secretions      Allergies    No Known Allergies    Intolerances        LABS:                        8.0    7.8   )-----------( 274      ( 05 Apr 2018 17:49 )             22.8     04-05    110<LL>  |  74<L>  |  43<H>  ----------------------------<  161<H>  5.4<H>   |  21<L>  |  1.60<H>    Ca    12.1<H>      05 Apr 2018 17:49    TPro  6.2  /  Alb  3.4  /  TBili  0.3  /  DBili  x   /  AST  79<H>  /  ALT  84<H>  /  AlkPhos  92  04-05    PT/INR - ( 05 Apr 2018 17:49 )   PT: 11.8 sec;   INR: 1.06          PTT - ( 05 Apr 2018 17:49 )  PTT:28.4 sec      RADIOLOGY & ADDITIONAL TESTS: Reviewed
LANI SAWYER   MRN-[id number]     (1914):     HPI:  89F with HTN, GERD, anemia, bedbound p/w altered mental status. History was obtained from daughter, Edith at bedside who is the patient's caretaker. Pt is usually bedbound, eats well, interactive at baseline. She had constipation earlier this week, started to eat less. She was given bowel regimen, with several episode of formed stool output.  Yesterday,  she had two episodes of NBNB vomiting and was less responsive this afternoon, so was brought to the ED. Pt was found to be bradycardic HR 20- 30s with third degree AV block, BP 89/41. Labs were also remarkable severe hyponatremia (Na 110), hypercalcemia (Ca 12.1), lactate 4.4, given 2LNS boluses, atropine and sodium bicarbonate, and Vancomycin and Zosyn.    ED physician discussed the severity of patient's illness with daughter, who is the healthcare proxy and pt was made DNR/DNI with comfort care, with IVF and antibiotics. (2018 20:25)    Pt remains with poor mental status. Not eating.  Non communicative. On IV fluid.  Pt is tachypneic, with agonal and Cheyne murguia respirations.    FAMILY HISTORY: unable to attain  No pertinent family history in first degree relatives  Reviewed and     ROS:    Unable to attain ROS due to:   mental status                    Dyspnea (Makenna 0-10):                        N/V (Y/N):                              Secretions (Y/N) :                Agitation(Y/N):   Pain (Y/N):       -Provocation/Palliation:  -Quality/Quantity:  -Radiating:  -Severity:  -Timing/Frequency:  -Impact on ADLs:    Allergies:  No Known Allergies    Intolerances    Opiate Naive (Y/N):  yes  -iStop reviewed (Y/N):  yes [facility] # [id number]    Medications:      MEDICATIONS  (STANDING):  ampicillin/sulbactam  IVPB      heparin  Injectable 5000 Unit(s) SubCutaneous every 12 hours  sodium chloride 0.9%. 1000 milliLiter(s) (70 mL/Hr) IV Continuous <Continuous>    MEDICATIONS  (PRN):    Labs:    no new labs                      Imaging:  no imaging available    PEx:  Vital Signs Last 24 Hrs  T(C): 36.3 (2018 16:00), Max: 36.3 (2018 16:00)  T(F): 97.3 (2018 16:00), Max: 97.3 (2018 16:00)  HR: 56 (2018 16:00) (56 - 56)  BP: --  BP(mean): --  RR: 14 (2018 16:00) (14 - 14)  SpO2: 96% (2018 16:00) (96% - 96%)      General: ill appearing, frail  HEENT:  nc/at, moist oral cavity, slight temporal wasting, wearing NC  Neck:  supple, + JVD with prominent neck veins  CVS:  irreg, bradycardia,  distal pulses do not correlate with heart tones, distal extremities cool- feet more than hands, dusky color to skin  Resp:  shallow, + abdominal breathing, + WOB, significant pauses in respiratory pattern  GI:  rounded, soft, nontender, +BS  :  incontinent  Musc:   not following commands, no other spontaneous movements noted  Neuro:  not following commands  Psych:   zaki, pt lethargic  Skin:  warm, dry, intact, no signs of breakdown  Lymph:  neg  Preadmit Karnofsky:  40 %           Current Karnofsky:  20   %  Cachexia (Y/N):  no  BMI:  not calculated as pts height not recorded in the med record    Advanced Directives:          DNR/DNI     MOLST- completed in ED          Decision maker:  Pt's does not appear to have medical decision making capacity as she has a somnolent mental status, and is not communicative.  Legal surrogate:  Pt"s dgt Edith Gaffney is pt's HCP    Social History: single, pt is from [place], Pt has a small family and they are in [facility] and some grandchildren live locally,   Pt has lived with her dgr Edith for > 30 yrs. Pt has a HHA 10 hrs /day  Pt is very Taoist and Spiritual, is of a Confucianist yobani background    GOALS OF CARE DISCUSSION:       Palliative care info/counseling provided	           Family meeting-- meet with pts dgt and g dgt in pts room       Advanced Directives addressed please see Advance Care Planning Note	                 Documentation of GOC: 	comfort care, no VS, no w/u, end of life care          REFERRALS:	        Palliative Med        Unit SW/Case Mgmt       - visit requested              Music Therapy       Hospice- will apply for inpt hospice care       Nutrition- requested comfort cart for family
OVERNIGHT EVENTS:    SUBJECTIVE / INTERVAL HPI: Patient seen and examined at bedside.     VITAL SIGNS:  Vital Signs Last 24 Hrs  T(C): 36.3 (08 Apr 2018 16:00), Max: 36.3 (08 Apr 2018 16:00)  T(F): 97.3 (08 Apr 2018 16:00), Max: 97.3 (08 Apr 2018 16:00)  HR: 56 (08 Apr 2018 16:00) (56 - 56)  BP: --  BP(mean): --  RR: 14 (08 Apr 2018 16:00) (14 - 14)  SpO2: 96% (08 Apr 2018 16:00) (96% - 96%)    PHYSICAL EXAM:    Constitutional: NAD,, elderly, sleeping in bed, appears comfortable   HEENT: no eye discharge, no nasal discharge, no pharyngeal erythema, moist membranes  Neck: no lymphadenopathy, no JVD,  no carotid bruit.  Cardiovascular: S1 and S2, RRR,  no M/R/G, non-displaced PMI  Respiratory: no wheezing, no rhonchi, no cough, no crackles, poor inspiratory effort, does not appear in respiratory distress.   Gastrointestinal: BS+, soft, non-distended, non-tender, no ascites  Extremities: warm to touch, no edema  Vascular: 2+ peripheral pulses, normal capillary refill  Neurological: obtunded , nonfocal  Musculoskeletal: no joint swelling  Skin: No rashes, no skin breakdown    MEDICATIONS:  MEDICATIONS  (STANDING):  sodium chloride 0.9%. 1000 milliLiter(s) (70 mL/Hr) IV Continuous <Continuous>    MEDICATIONS  (PRN):  morphine  - Injectable 3 milliGRAM(s) IV Push every 4 hours PRN RR >16      ALLERGIES:  Allergies    No Known Allergies    Intolerances        LABS:        CAPILLARY BLOOD GLUCOSE          RADIOLOGY & ADDITIONAL TESTS: Reviewed.

## 2018-04-09 NOTE — PROGRESS NOTE ADULT - PROBLEM SELECTOR PROBLEM 6
Comfort measures only status
Junctional bradycardia
Comfort measures only status
Comfort measures only status
DNR (do not resuscitate)

## 2018-04-09 NOTE — PROGRESS NOTE ADULT - ATTENDING COMMENTS
Seems to be actively dying. Daughter and great grandaughter at bedside. C/w comfort care measures. Apprec Palliative assistance.
dx  severe symptomatic hyponatremia - per family wishes, no labs. supporitive care at this time.   toxic metabolic encephalopathy in setting of dementia - high aspiration risk. NPO. supportive care. palliative following. morphine prn  bibasilar atelectasis - cont supplemental oxygen. pt is afebrile, w/o leukocytosis or cough---doubt pneumonia.   functional quadriplegia- supportive care  lactic acidosis - supportive care. cont ivfs per family wishes  bradycardia / heart block - supportive care  dispo- pt is DNR/DNI/ comfort care.

## 2018-04-09 NOTE — PROGRESS NOTE ADULT - PROBLEM SELECTOR PLAN 9
Advance care planning meeting  Start time:  1200p  End time: 1250p  Total time: 50 min    A face to face meeting to discuss advance care planning was held today regarding: LANI SAWYER  Primary decision maker: Pts dgt Jay Gaffney  Alternate/surrogate: none  Discussed advance directives including, but not limited to, healthcare proxy and code status.  Reviewed current status.  Pt remains on IVF at 70 cc/hr.  Advised that IVF is not contributing to pts comfort and may eventually contribute to increased symptom burden.  Pts HCP is not comfortable with slowing or stopping IVF at this time, despite acknowledging that pt is actively dying.   Transfer to inpt hospice discussed and family ok with submitting applications to [facility].  Chaplaincy, nutrition and music therapy to visit with pt and her dgt.     Decision regarding code status:  pt has a DNR/DNI order in place  Documentation completed today:  none

## 2018-04-09 NOTE — DISCHARGE NOTE ADULT - PATIENT PORTAL LINK FT
You can access the [facility] Patient Portal, offered by [facility], by registering with the following website: [email or web address]

## 2018-04-09 NOTE — PROGRESS NOTE ADULT - PROBLEM SELECTOR PLAN 8
Support provided to patient and family. Patient to have access to supportive services during rest of hospital stay as the patient/family deemed necessary ie. Chaplaincy, Music therapy, Patient and family supportive services, Palliative SW, etc    Application for transfer to [facility] will be submitted today, although pt is at very high risk of dying imminently.

## 2018-04-09 NOTE — DISCHARGE NOTE ADULT - CARE PLAN
Principal Discharge DX:	Palliative care by specialist  Goal:	Treatment plan  Assessment and plan of treatment:	You are being transferred home with home hospice; please defer to them for further management and recommendations.

## 2018-04-09 NOTE — PROGRESS NOTE ADULT - ASSESSMENT
89F with HTN, GERD, anemia, bedbound p/w altered mental status a/w vomiting, diarrhea found to have junctional bradycardia with electrolyte derangement (severe hyponatremia, hypercalcemia), concern for aspiration pneumonitis with goals of comfort care.
89F with,
89F with HTN, GERD, anemia, bedbound p/w altered mental status a/w vomiting, diarrhea found to have junctional bradycardia with electrolyte derangement (severe hyponatremia, hypercalcemia), concern for aspiration pneumonitis with goals of comfort care.
89F with HTN, GERD, anemia, bedbound p/w altered mental status a/w vomiting, diarrhea found to have junctional bradycardia with electrolyte derangement (severe hyponatremia, hypercalcemia), concern for aspiration pneumonitis with goals of comfort care.
89F with HTN, GERD, anemia, bedbound p/w altered mental status a/w vomiting, diarrhea found to have junctional bradycardia with electrolyte derangement (severe hyponatremia, hypercalcemia), concern for aspiration pneumonitis, comfort care pending home with home hospice.

## 2018-04-09 NOTE — PROGRESS NOTE ADULT - PROBLEM SELECTOR PLAN 1
Poor baseline CXR, high aspiration risk. Patient comfort care at this time.   - no clinical signs of infection; no VS  - d/c antibiotics

## 2018-04-09 NOTE — PROGRESS NOTE ADULT - PROBLEM SELECTOR PROBLEM 4
Junctional bradycardia
Vomiting and diarrhea
Junctional bradycardia

## 2018-04-09 NOTE — PROGRESS NOTE ADULT - PROBLEM SELECTOR PLAN 3
Last Na was 110 and pt on Saline infusion at 70 cc/hr.  not checking labs as not consistent with overall goals of care

## 2018-04-09 NOTE — PROGRESS NOTE ADULT - PROBLEM SELECTOR PROBLEM 7
Hypotension, unspecified hypotension type
DNI (do not intubate)
Nutrition, metabolism, and development symptoms
Nutrition, metabolism, and development symptoms

## 2018-04-09 NOTE — DISCHARGE NOTE FOR THE EXPIRED PATIENT - HOSPITAL COURSE
89F with HTN, GERD, anemia, bedbound p/w altered mental status above baseline; Pt is usually bedbound, eats well, interactive at baseline. She had constipation earlier this week, started to eat less. She was given bowel regimen, with several episode of formed stool output. Day prior to presentation, had NBNB vomiting and was less responsive -  then presented to [facility] ED. Pt found to be HR 20-30s, 3deg AV block, hypotensive 89/41, hyponatremic (Na 110),  hypercalcemia (Ca 12.1), lactate 4.4, given 2LNS boluses, atropine and sodium bicarbonate, and Vancomycin and Zosyn. ED physician discussed the severity of patient's illness with daughter, who is the healthcare proxy and pt was made DNR/DNI with comfort care, with IVF and antibiotics -  patient hen transferred to [facility] for further management and pending placement. Per discussion with family (Edith) and palliative medicine, plan for discharge to hospice however at 17:30 notified by RN patient was no longer breathing. Patient was then pronounced  at 17:44 after full cardiopulmonary exam.

## 2018-04-09 NOTE — DISCHARGE NOTE ADULT - PLAN OF CARE
Treatment plan You are being transferred home with home hospice; please defer to them for further management and recommendations.

## 2018-04-09 NOTE — PROGRESS NOTE ADULT - PROBLEM SELECTOR PLAN 8
VTE: Initially on HSQ but discontinued for comfort measures.    DNR/DNI- molst in chart.    Dispo: Admitted to [facility] for altered mental status but now comfort measures pending home hospice placement.

## 2018-04-09 NOTE — PROGRESS NOTE ADULT - PROBLEM SELECTOR PROBLEM 8
Comfort measures only status
Palliative care by specialist
Prophylactic measure
Prophylactic measure

## 2018-04-10 LAB
CULTURE RESULTS: SIGNIFICANT CHANGE UP
CULTURE RESULTS: SIGNIFICANT CHANGE UP
SPECIMEN SOURCE: SIGNIFICANT CHANGE UP
SPECIMEN SOURCE: SIGNIFICANT CHANGE UP

## 2018-04-16 DIAGNOSIS — R19.7 DIARRHEA, UNSPECIFIED: ICD-10-CM

## 2018-04-16 DIAGNOSIS — J69.0 PNEUMONITIS DUE TO INHALATION OF FOOD AND VOMIT: ICD-10-CM

## 2018-04-16 DIAGNOSIS — E87.1 HYPO-OSMOLALITY AND HYPONATREMIA: ICD-10-CM

## 2018-04-16 DIAGNOSIS — K21.9 GASTRO-ESOPHAGEAL REFLUX DISEASE WITHOUT ESOPHAGITIS: ICD-10-CM

## 2018-04-16 DIAGNOSIS — R53.2 FUNCTIONAL QUADRIPLEGIA: ICD-10-CM

## 2018-04-16 DIAGNOSIS — Z74.01 BED CONFINEMENT STATUS: ICD-10-CM

## 2018-04-16 DIAGNOSIS — R06.02 SHORTNESS OF BREATH: ICD-10-CM

## 2018-04-16 DIAGNOSIS — E83.52 HYPERCALCEMIA: ICD-10-CM

## 2018-04-16 DIAGNOSIS — I44.2 ATRIOVENTRICULAR BLOCK, COMPLETE: ICD-10-CM

## 2018-04-16 DIAGNOSIS — R00.1 BRADYCARDIA, UNSPECIFIED: ICD-10-CM

## 2018-04-16 DIAGNOSIS — G92 TOXIC ENCEPHALOPATHY: ICD-10-CM

## 2018-04-16 DIAGNOSIS — J96.01 ACUTE RESPIRATORY FAILURE WITH HYPOXIA: ICD-10-CM

## 2018-04-16 DIAGNOSIS — Z66 DO NOT RESUSCITATE: ICD-10-CM

## 2018-04-16 DIAGNOSIS — J98.11 ATELECTASIS: ICD-10-CM

## 2018-04-16 DIAGNOSIS — D64.9 ANEMIA, UNSPECIFIED: ICD-10-CM

## 2020-06-09 NOTE — PROGRESS NOTE ADULT - PROBLEM SELECTOR PLAN 5
hx of HTN but hypotensive on presentation in setting of bradycardia.   - c/w holding antihypertensives. No vitals/MEWs at this time as per HCP discussion o/n. Benzoyl Peroxide Counseling: Patient counseled that medicine may cause skin irritation and bleach clothing.  In the event of skin irritation, the patient was advised to reduce the amount of the drug applied or use it less frequently.   The patient verbalized understanding of the proper use and possible adverse effects of benzoyl peroxide.  All of the patient's questions and concerns were addressed.

## 2020-11-04 NOTE — PROVIDER CONTACT NOTE (CRITICAL VALUE NOTIFICATION) - NS PROVIDER READ BACK
I reviewed the H&P, I examined the patient, and there are no changes in the patient's condition.    
yes

## 2021-04-19 NOTE — H&P ADULT - PROBLEM/PLAN-8
Called pt and informed result  
Patient was giving you a call back for results     Number is chart is correct for call back   
DISPLAY PLAN FREE TEXT

## 2022-03-02 NOTE — PROGRESS NOTE ADULT - PROBLEM SELECTOR PROBLEM 9
Please email letter to the patient ([email or web address])
Nutrition, metabolism, and development symptoms
Advanced care planning/counseling discussion
